# Patient Record
Sex: FEMALE | Race: WHITE | NOT HISPANIC OR LATINO | URBAN - NONMETROPOLITAN AREA
[De-identification: names, ages, dates, MRNs, and addresses within clinical notes are randomized per-mention and may not be internally consistent; named-entity substitution may affect disease eponyms.]

---

## 2019-12-04 ENCOUNTER — IMPORTED ENCOUNTER (OUTPATIENT)
Dept: URBAN - NONMETROPOLITAN AREA CLINIC 1 | Facility: CLINIC | Age: 64
End: 2019-12-04

## 2019-12-04 PROBLEM — H52.4: Noted: 2019-12-16

## 2019-12-04 PROBLEM — H25.13: Noted: 2019-12-04

## 2019-12-04 PROBLEM — H43.813: Noted: 2019-12-04

## 2019-12-04 PROCEDURE — 92250 FUNDUS PHOTOGRAPHY W/I&R: CPT

## 2019-12-04 PROCEDURE — 99213 OFFICE O/P EST LOW 20 MIN: CPT

## 2019-12-04 NOTE — PATIENT DISCUSSION
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 Patient understands and agrees with plan- Continue to monitorPVD OU - Discussed findings of exam in detail with the patient. - The risk of retinal detachment in patients with PVDs was discussed with the patient and the warning signs of retinal detachment were carefully reviewed with the patient. - The patient was warned to return to the office or contact the ophthalmologist on call immediately if they experience signs of retinal detachment. - Optos done today shows PVD OU but no sign of retinal holes tears or detachments - Continue to monitor; 's Notes: Jose Ramon Meléndez

## 2019-12-16 ENCOUNTER — IMPORTED ENCOUNTER (OUTPATIENT)
Dept: URBAN - NONMETROPOLITAN AREA CLINIC 1 | Facility: CLINIC | Age: 64
End: 2019-12-16

## 2019-12-16 PROCEDURE — 99214 OFFICE O/P EST MOD 30 MIN: CPT

## 2019-12-16 PROCEDURE — 92015 DETERMINE REFRACTIVE STATE: CPT

## 2019-12-16 NOTE — PATIENT DISCUSSION
Eduardo OU- Discussed diagnosis in detail with patient- Discussed signs and symptoms of progression- Discussed UV protection- Recommend cataract eval with Dr. Gopi Chambers. Patient agrees with plan. Patient would like to get glasses to get her by until the cataract eval due to having to get her CDL's - Continue to monitorPVD OU - Discussed findings of exam in detail with the patient. - The risk of retinal detachment in patients with PVDs was discussed with the patient and the warning signs of retinal detachment were carefully reviewed with the patient. - The patient was warned to return to the office or contact the ophthalmologist on call immediately if they experience signs of retinal detachment. - Optos done today shows PVD OU but no sign of retinal holes tears or detachments - Continue to monitor Presbyopia OU - Discussed diagnosis in detail with patient - New glasses Rx given today - Continue to monitor; 's Notes: Cristina Lesches

## 2020-01-29 ENCOUNTER — IMPORTED ENCOUNTER (OUTPATIENT)
Dept: URBAN - NONMETROPOLITAN AREA CLINIC 1 | Facility: CLINIC | Age: 65
End: 2020-01-29

## 2020-01-29 PROBLEM — H25.813: Noted: 2020-01-29

## 2020-01-29 PROCEDURE — 99214 OFFICE O/P EST MOD 30 MIN: CPT

## 2020-01-29 NOTE — PATIENT DISCUSSION
Cataract(s)-Visually significant cataract OU.-Cataract(s) causing symptomatic impairment of visual function not correctable with a tolerable change in glasses or contact lenses lighting or non-operative means resulting in specific activity limitations and/or participation restrictions including but not limited to reading viewing television driving or meeting vocational or recreational needs. -Expectation is clearer vision and functional improvement in symptoms as well as reduced glare disability after cataract removal.-Order IOLMaster and OPD today. -Recommend Standard/Trad  based on today's OPD testing and lifestyle questionnaire.-All questions were answered regarding surgery including pre and post-op medications appointments activity restrictions and anesthetic usage.-The risks benefits and alternatives and special risk factors for the patient were discussed in detail including but not limited to: bleeding infection retinal detachment vitreous loss problems with the implant and possible need for additional surgery.-Although rare the possibility of complete vision loss was discussed.-The possible need for glasses post-operatively was discussed.-Order medical clearance exam based on history of arthritis and headaches -Patient elects to proceed with cataract surgery OD . Will schedule at patient's convenience and re-evaluate OS  in the future. Discussed all lens options in detail with patient. Recc Standard IOL/Trad OU. 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 Informed patient that she will still need glasses after surgery d/t her astigmatism to be able to see 20/20.   Can not guarantee 20/20 vision with or without glasses after surgery and informed her of this.; 's Notes: MROCTOptos

## 2020-03-10 ENCOUNTER — IMPORTED ENCOUNTER (OUTPATIENT)
Dept: URBAN - NONMETROPOLITAN AREA CLINIC 1 | Facility: CLINIC | Age: 65
End: 2020-03-10

## 2020-03-10 PROBLEM — M10.9: Noted: 2020-03-10

## 2020-03-10 PROBLEM — M19.90: Noted: 2020-03-10

## 2020-03-10 PROBLEM — Z01.818: Noted: 2020-03-10

## 2020-03-10 NOTE — PATIENT DISCUSSION
Medical Clearance-Medical clearance done today. -No outstanding concerns that would preclude surgery.-Patient is cleared to proceed with scheduled surgery.; 's Notes: Veronique

## 2020-03-17 ENCOUNTER — IMPORTED ENCOUNTER (OUTPATIENT)
Dept: URBAN - NONMETROPOLITAN AREA CLINIC 1 | Facility: CLINIC | Age: 65
End: 2020-03-17

## 2020-03-17 PROBLEM — Z98.41: Noted: 2020-03-17

## 2020-03-17 PROBLEM — H25.812: Noted: 2020-03-17

## 2020-03-17 PROCEDURE — 92136 OPHTHALMIC BIOMETRY: CPT

## 2020-03-17 PROCEDURE — 99024 POSTOP FOLLOW-UP VISIT: CPT

## 2020-03-17 PROCEDURE — 66984 XCAPSL CTRC RMVL W/O ECP: CPT

## 2020-03-17 NOTE — PATIENT DISCUSSION
Cataract-Visually significant cataract OS . -Cataract(s) causing symptomatic impairment of visual function not correctable with a tolerable change in glasses or contact lenses lighting or non-operative means resulting in specific activity limitations and/or participation restrictions including but not limited to reading viewing television driving or meeting vocational or recreational needs. -Expectation is clearer vision and functional improvement in symptoms as well as reduced glare disability after cataract removal.-Recommend Toric IOL   /   Limbal Relaxing Incisions based on previous OPD testing and lifestyle questionnaire.-All questions were answered regarding surgery including pre and post-op medications appointments activity restrictions and anesthetic usage.-The risks benefits and alternatives and special risk factors for the patient were discussed in detail including but not limited to: bleeding infection retinal detachment vitreous loss problems with the implant and possible need for additional surgery.-Although rare the possibility of complete vision loss was discussed.-The need for glasses post-operatively was discussed.-Patient elects to proceed with cataract surgery OS . Will schedule at patient's convenience. s/p PCIOL Same Day CE OD Standard -Pt doing well s/p PCIOL. -Continue post-op gtts according to instruction sheet and sleep with eye shield over eye for 7 nights.-Avoid bending at the waist lifting anything over 5lbs and dirty or manan environments. -RTC .; 's Notes: Veronique

## 2020-03-19 ENCOUNTER — IMPORTED ENCOUNTER (OUTPATIENT)
Dept: URBAN - NONMETROPOLITAN AREA CLINIC 1 | Facility: CLINIC | Age: 65
End: 2020-03-19

## 2020-03-19 PROBLEM — H25.812: Noted: 2020-03-19

## 2020-03-19 PROBLEM — Z98.41: Noted: 2020-03-19

## 2020-03-19 PROBLEM — H43.813: Noted: 2020-03-19

## 2020-03-19 PROCEDURE — 99024 POSTOP FOLLOW-UP VISIT: CPT

## 2020-03-19 NOTE — PATIENT DISCUSSION
PVD OU- Discussed findings of exam in detail with the patient. - The risk of retinal detachment in patients with PVDs was discussed with the patient and the warning signs of retinal detachment were carefully reviewed with the patient. - The patient was warned to return to the office or contact the ophthalmologist on call immediately if they experience signs of retinal detachment. - Optos done today shows PVD OU but no signs of retinal holes tears or detachments (No charge to patient per Dr. Polly Ulloa)- Continue to monitor ---------------------------previous notes-------------------------Cataract-Visually significant cataract OS . -Cataract(s) causing symptomatic impairment of visual function not correctable with a tolerable change in glasses or contact lenses lighting or non-operative means resulting in specific activity limitations and/or participation restrictions including but not limited to reading viewing television driving or meeting vocational or recreational needs. -Expectation is clearer vision and functional improvement in symptoms as well as reduced glare disability after cataract removal.-Recommend Toric IOL   /   Limbal Relaxing Incisions based on previous OPD testing and lifestyle questionnaire.-All questions were answered regarding surgery including pre and post-op medications appointments activity restrictions and anesthetic usage.-The risks benefits and alternatives and special risk factors for the patient were discussed in detail including but not limited to: bleeding infection retinal detachment vitreous loss problems with the implant and possible need for additional surgery.-Although rare the possibility of complete vision loss was discussed.-The need for glasses post-operatively was discussed.-Patient elects to proceed with cataract surgery OS . Will schedule at patient's convenience. s/p PCIOL Same Day CE OD Standard -Pt doing well s/p PCIOL. -Continue post-op gtts according to instruction sheet and sleep with eye shield over eye for 7 nights.-Avoid bending at the waist lifting anything over 5lbs and dirty or manan environments. -RTC .; 's Notes: Veronique

## 2020-04-29 ENCOUNTER — IMPORTED ENCOUNTER (OUTPATIENT)
Dept: URBAN - NONMETROPOLITAN AREA CLINIC 1 | Facility: CLINIC | Age: 65
End: 2020-04-29

## 2020-04-29 PROBLEM — H25.812: Noted: 2020-04-29

## 2020-04-29 PROBLEM — H43.813: Noted: 2020-04-29

## 2020-04-29 PROBLEM — Z98.41: Noted: 2020-04-29

## 2020-04-29 PROBLEM — H52.4: Noted: 2020-04-29

## 2020-04-29 NOTE — PATIENT DISCUSSION
s/p CE OD Standard - Cataract OS - I spoke to patient on the phone on 4/27 who said she is having trouble functioning since her cataract surgery OS was postponed due to Eleazar Pruitt done by me today- Patients BCVA OU today is 20/30 stressed to patient this is within the legan limit for driving. - Discussed the difference between single vision and bifocals- Recommend patient get distance vision glasses to get her by until she can have surgery in her left eye. - Discussed with patient that she will need to change the lens for the left eye after she has surgery in the left eye patient states understanding. - Discussed decreasing the power in the left eye in this pair until she has surgery patient agrees with plan.  - Patient wants both glasses lenses changed today and elects to have single vision- All questions answered in office.; 's Notes: Luis Antonio Tsai

## 2020-05-04 ENCOUNTER — IMPORTED ENCOUNTER (OUTPATIENT)
Dept: URBAN - NONMETROPOLITAN AREA CLINIC 1 | Facility: CLINIC | Age: 65
End: 2020-05-04

## 2020-05-04 PROBLEM — H25.812: Noted: 2020-05-04

## 2020-05-04 PROBLEM — H52.4: Noted: 2020-05-04

## 2020-05-04 PROBLEM — H43.813: Noted: 2020-05-04

## 2020-05-04 PROBLEM — Z98.41: Noted: 2020-05-04

## 2020-05-05 ENCOUNTER — IMPORTED ENCOUNTER (OUTPATIENT)
Dept: URBAN - NONMETROPOLITAN AREA CLINIC 1 | Facility: CLINIC | Age: 65
End: 2020-05-05

## 2020-05-05 PROBLEM — M10.9: Noted: 2020-05-05

## 2020-05-05 PROBLEM — M19.90: Noted: 2020-05-05

## 2020-05-05 PROBLEM — H25.12: Noted: 2020-05-05

## 2020-05-05 PROBLEM — Z01.818: Noted: 2020-05-05

## 2020-05-05 PROCEDURE — 99213 OFFICE O/P EST LOW 20 MIN: CPT

## 2020-05-05 NOTE — PATIENT DISCUSSION
Medical Clearance-Medical clearance done today. -No outstanding concerns that would preclude surgery.-Patient is cleared to proceed with scheduled surgery. Cataract(s)-Visually significant cataract OS . -Cataract(s) causing symptomatic impairment of visual function not correctable with a tolerable change in glasses or contact lenses lighting or non-operative means resulting in specific activity limitations and/or participation restrictions including but not limited to reading viewing television driving or meeting vocational or recreational needs. -Expectation is clearer vision and functional improvement in symptoms as well as reduced glare disability after cataract removal.-Order IOLMaster and OPD today. -Recommend Standard/Traditional based on today's OPD testing and lifestyle questionnaire.-All questions were answered regarding surgery including pre and post-op medications appointments activity restrictions and anesthetic usage.-The risks benefits and alternatives and special risk factors for the patient were discussed in detail including but not limited to: bleeding infection retinal detachment vitreous loss problems with the implant and possible need for additional surgery.-Although rare the possibility of complete vision loss was discussed.-The possible need for glasses post-operatively was discussed.-Order medical clearance exam based on history of arthritis-Patient elects to proceed with cataract surgery OS .  Will schedule at patient's convenience; 's Notes: Solomon Silvestre

## 2020-05-28 ENCOUNTER — IMPORTED ENCOUNTER (OUTPATIENT)
Dept: URBAN - NONMETROPOLITAN AREA CLINIC 1 | Facility: CLINIC | Age: 65
End: 2020-05-28

## 2020-05-28 PROBLEM — Z98.42: Noted: 2020-05-28

## 2020-05-28 PROCEDURE — 99024 POSTOP FOLLOW-UP VISIT: CPT

## 2020-05-28 NOTE — PATIENT DISCUSSION
1 day POV CE IOL OS stand/trad (05/27/20) / 10 week POV CE IOL OD stand/trad (03/17/20)- Discussed diagnosis in detail with patient- Patient is stable and doing well- Wound intact- Continue all post op drops as directed- Continue to monitor- RTC 1 week POV; 's Notes: Keene Pals

## 2020-06-04 ENCOUNTER — IMPORTED ENCOUNTER (OUTPATIENT)
Dept: URBAN - NONMETROPOLITAN AREA CLINIC 1 | Facility: CLINIC | Age: 65
End: 2020-06-04

## 2020-06-04 PROCEDURE — 99024 POSTOP FOLLOW-UP VISIT: CPT

## 2020-06-04 NOTE — PATIENT DISCUSSION
1 weekPOV CE IOL OS stand/trad (05/27/20) / 10 week POV CE IOL OD stand/trad (03/17/20)- Discussed diagnosis in detail with patient- Patient is stable and doing well- Wound intact- Continue all post op drops as directed- MR done today by Dr. Hang Beauchamp and new glasses RX given again today explained to patient that her vision will continue to change while she is still healing and given a RX today might change again in 3 weeks when we check it again.  Patient still would like glasses RX so she can go back to work - Continue to monitor- RTC 3 weeks POV MR; 's Notes: Lorean Keto

## 2020-06-12 ENCOUNTER — IMPORTED ENCOUNTER (OUTPATIENT)
Dept: URBAN - NONMETROPOLITAN AREA CLINIC 1 | Facility: CLINIC | Age: 65
End: 2020-06-12

## 2020-06-12 PROCEDURE — 99024 POSTOP FOLLOW-UP VISIT: CPT

## 2020-06-12 NOTE — PATIENT DISCUSSION
1 weekPOV CE IOL OS stand/trad (05/27/20) / 10 week POV CE IOL OD stand/trad (03/17/20)- Discussed diagnosis in detail with patient- Patient is stable and doing well- Wound intact- Continue all post op drops as directed- MR done at last visit by Dr. Valencia Duenas and new glasses RX given again today explained to patient that her vision will continue to change while she is still healing and given a RX today might change again in 3 weeks when we check it again. Patient still would like glasses RX so she can go back to work - Explained to patient that their is nothing in her eye she needs to lubricate.  - Samples of Refresh Relieva given today- Continue to monitor- RTC 3 weeks POV MR; 's Notes: Veryl Candy

## 2020-08-28 ENCOUNTER — IMPORTED ENCOUNTER (OUTPATIENT)
Dept: URBAN - NONMETROPOLITAN AREA CLINIC 1 | Facility: CLINIC | Age: 65
End: 2020-08-28

## 2020-08-28 PROCEDURE — 92015 DETERMINE REFRACTIVE STATE: CPT

## 2020-08-28 NOTE — PATIENT DISCUSSION
Refraction only - Discussed diagnosis in detail with patient - MR done todya by Dr. Merlin Cool hand new glasses RX given - Continue to monitor - RTC 6 months Pseudophakia OU -----------------------------preivous note----------------------1 weekPOV CE IOL OS stand/trad (05/27/20) / 10 week POV CE IOL OD stand/trad (03/17/20)- Discussed diagnosis in detail with patient- Patient is stable and doing well- Wound intact- Continue all post op drops as directed- MR done at last visit by Dr. Jadon Modi and new glasses RX given again today explained to patient that her vision will continue to change while she is still healing and given a RX today might change again in 3 weeks when we check it again. Patient still would like glasses RX so she can go back to work - Explained to patient that their is nothing in her eye she needs to lubricate.  - Samples of Refresh Relieva given today- Continue to monitor- RTC 3 weeks POV MR; 's Notes: Juan Stewart

## 2020-09-23 ENCOUNTER — IMPORTED ENCOUNTER (OUTPATIENT)
Dept: URBAN - NONMETROPOLITAN AREA CLINIC 1 | Facility: CLINIC | Age: 65
End: 2020-09-23

## 2020-09-23 PROBLEM — H16.222: Noted: 2020-09-23

## 2020-09-23 PROBLEM — H26.493: Noted: 2020-09-23

## 2020-09-23 PROCEDURE — 99213 OFFICE O/P EST LOW 20 MIN: CPT

## 2020-09-23 NOTE — PATIENT DISCUSSION
LACY OS- Discussed diagnosis in detail with patient- Discussed signs and symptoms of progression- Recommend patient drinking plenty of water and starting Omega 3’s - Recommend Refresh or Systane  throughout the day- Start Lotemax TID OS x 1 week then once feeling better taper to BID 2 samples given today- Continue to monitorS/P POV CE OS stand/trad (05/27/20) S//P POV CE OD stand/trad (03/17/20)- Discussed diagnosis in detail with patient- PCO noted today but stable and no treatment needed at this time  - Continue to monitor; 's Notes: Carmen Aleman

## 2021-06-29 ENCOUNTER — IMPORTED ENCOUNTER (OUTPATIENT)
Dept: URBAN - NONMETROPOLITAN AREA CLINIC 1 | Facility: CLINIC | Age: 66
End: 2021-06-29

## 2021-06-29 PROBLEM — H52.4: Noted: 2021-06-29

## 2021-06-29 PROBLEM — Z96.1: Noted: 2021-06-29

## 2021-06-29 PROBLEM — H26.493: Noted: 2021-06-29

## 2021-06-29 PROBLEM — H16.222: Noted: 2021-06-29

## 2021-06-29 PROCEDURE — 99213 OFFICE O/P EST LOW 20 MIN: CPT

## 2021-06-29 PROCEDURE — 92015 DETERMINE REFRACTIVE STATE: CPT

## 2021-06-29 NOTE — PATIENT DISCUSSION
Pseudophakia OU with PCO OU- Discussed diagnosis in detail with patient- PCO noted today but stable and no treatment needed at this time - Continue to monitorDES OS- Discussed diagnosis in detail with patient- Discussed signs and symptoms of progression- Recommend patient drinking plenty of water and starting Omega 3’s - Recommend Refresh or Systane  throughout the day- Continue to monitorAstigmatism / Hyperopia / Presbyopia OU - Discussed diagnosis in detail with patient- New Glasses RX given today MR done today by Dr. Trimble Hand - Continue to monitor- RTC 1 year complete with BAT; 's Notes: Luis Antonio Tsai

## 2022-04-09 ASSESSMENT — VISUAL ACUITY
OS_SC: 20/50-
OD_CC: 20/20
OD_CC: 20/200
OS_SC: 20/40-
OS_SC: 20/25
OS_PH: 20/29-
OD_PH: 20/29+
OD_SC: 20/50-2
OD_CC: 20/200
OS_CC: 20/200
OS_AM: 20/30
OD_PH: 20/50+
OS_SC: 20/200
OS_PH: 20/29-
OS_GLARE: 20/250
OD_SC: 20/50
OD_GLARE: 20/200
OD_CC: 20/50
OS_SC: 20/40-
OD_SC: 20/25-
OD_GLARE: 20/200
OS_CC: 20/25
OS_GLARE: 20/400
OS_PH: 20/29-
OS_SC: 20/32-
OS_SC: 20/30-
OD_PAM: 20/30
OD_CC: 20/20-
OS_CC: 20/100
OD_SC: 20/29
OS_SC: 20/100
OS_CC: 20/63
OD_CC: 20/40+
OD_SC: 20/20-
OS_AM: 20/30
OS_GLARE: 20/250
OD_SC: 20/100
OS_AM: 20/30
OS_CC: 20/63-
OD_PH: 20/50+
OS_PH: 20/29-
OS_CC: 20/20-2
OS_SC: 20/29
OS_GLARE: 20/250
OD_SC: 20/25
OD_SC: 20/63

## 2022-04-09 ASSESSMENT — TONOMETRY
OS_IOP_MMHG: 16
OD_IOP_MMHG: 13
OD_IOP_MMHG: 14
OS_IOP_MMHG: 12
OS_IOP_MMHG: 16
OS_IOP_MMHG: 14
OD_IOP_MMHG: 15
OS_IOP_MMHG: 13
OD_IOP_MMHG: 14
OS_IOP_MMHG: 17
OD_IOP_MMHG: 18
OS_IOP_MMHG: 14
OD_IOP_MMHG: 20
OD_IOP_MMHG: 14
OD_IOP_MMHG: 13
OD_IOP_MMHG: 19
OS_IOP_MMHG: 19
OD_IOP_MMHG: 16
OS_IOP_MMHG: 19
OS_IOP_MMHG: 15